# Patient Record
Sex: FEMALE | Race: WHITE | NOT HISPANIC OR LATINO | Employment: OTHER | URBAN - METROPOLITAN AREA
[De-identification: names, ages, dates, MRNs, and addresses within clinical notes are randomized per-mention and may not be internally consistent; named-entity substitution may affect disease eponyms.]

---

## 2023-12-11 ENCOUNTER — OFFICE VISIT (OUTPATIENT)
Dept: UROLOGY | Facility: CLINIC | Age: 73
End: 2023-12-11
Payer: MEDICARE

## 2023-12-11 DIAGNOSIS — N81.10 VAGINAL PROLAPSE: Primary | ICD-10-CM

## 2023-12-11 DIAGNOSIS — N39.3 STRESS INCONTINENCE: ICD-10-CM

## 2023-12-11 PROCEDURE — A4562 PESSARY, NON RUBBER,ANY TYPE: HCPCS | Performed by: STUDENT IN AN ORGANIZED HEALTH CARE EDUCATION/TRAINING PROGRAM

## 2023-12-11 PROCEDURE — 99205 OFFICE O/P NEW HI 60 MIN: CPT | Performed by: STUDENT IN AN ORGANIZED HEALTH CARE EDUCATION/TRAINING PROGRAM

## 2023-12-11 PROCEDURE — 57160 INSERT PESSARY/OTHER DEVICE: CPT | Performed by: STUDENT IN AN ORGANIZED HEALTH CARE EDUCATION/TRAINING PROGRAM

## 2023-12-11 RX ORDER — CELECOXIB 50 MG/1
400 CAPSULE ORAL ONCE
Status: CANCELLED | OUTPATIENT
Start: 2023-12-11 | End: 2023-12-11

## 2023-12-11 RX ORDER — GABAPENTIN 600 MG/1
600 TABLET ORAL ONCE
Status: CANCELLED | OUTPATIENT
Start: 2023-12-11 | End: 2023-12-11

## 2023-12-11 RX ORDER — ACETAMINOPHEN 325 MG/1
975 TABLET ORAL ONCE
Status: CANCELLED | OUTPATIENT
Start: 2023-12-11 | End: 2023-12-11

## 2023-12-11 NOTE — PROGRESS NOTES
"Referred by: Her friend    PCP  Cullen Bradley DO         CHIEF COMPLAINT:  NPV Bladder prolapse           HISTORY OF PRESENT ILLNESS:  This is a 73 y.o. y.o. female,  who presents as a new patient for a bladder prolapse. The patient can feel a vaginal bulge for the last two years. The patient denies UUI, urgency, frequency, and DEX. The patient stated her bowel movements are normal, going daily. She denies pelvic pain, dryness, gross hematuria. The patient is not sexually active but would like to be in the future. The patient had one vaginal birth stating she had tearing, the physician \"left extra skin\" in the repair causing her to have swelling and irritation near her anus. She reports she feels if she can empty her bladder, and can void without straining or pushing. The patient has not tried a pessary for her prolapse and would like to discuss surgery as an option. The patient denies a history of breast cancer.              Past Medical History  She has no past medical history on file.    Surgical History  She has a past surgical history that includes CT angio coronary art with heartflow if score >30% (2016).     Social History  She has no history on file for tobacco use, alcohol use, and drug use.    Family History  No family history on file.     Allergies  Patient has no allergy information on record.        A comprehensive 10+ review of systems was negative except for: see hpi                    PHYSICAL EXAMINATION:  BP Readings from Last 3 Encounters:   No data found for BP      Wt Readings from Last 3 Encounters:   No data found for Wt      BMI: There is no height or weight on file to calculate BMI.  BSA: There is no height or weight on file to calculate BSA.  HEENT: Normocephalic, atraumatic, PER EOMI, nonicteric, trachea normal, thyroid normal, oropharynx normal.  CARDIAC: regular rate & rhythm, S1 & S2 normal.  No heaves, thrills, gallops or murmurs.  LUNGS: Clear to auscultation, no spinal or " CV tenderness.  EXTREMITIES: No evidence of cyanosis, clubbing or edema.      Pelvic:  Genitourinary:  normal external genitalia, Bartholin's glands negative, [place]'s glands negative  Urethra   normal meatus, non-tender, no periurethral mass  Vaginal mucosa  normal  Cervix  normal  Uterus  normal size, nontender  Adnexae  negative nontender, no masses  Atrophy positive    CST negative  Pelvic floor muscle contraction  4/5    POP-Q (in supine position):        Aa 1     Ba 1     C -3              gh 3     pb 3     tvl 8              Ap -2     Bp -2     D -3    Rectal: no hemorrhoids, fissures or masses    PVR (by Ultrasound): 5         IMPRESSION AND PLAN:  Fallon Williamson is a 73 y.o.   who presents as a new patient for a bladder prolapse.     Stage 2 UVP     I discussed treatment options including pessary and surgery, with regard to surgery discussed hysterectomy vs. Hysteropexy: major benefit of hysteropexy is shorter OR time and less EBL and outcomes equivalent to hysterectomy + repair at 3 years, but no data beyond that time point. Also discussed SCP vs native tissue repair; for SCP the failure rate is 5-10%, but associated with mesh complications including erosion <1% and SBO <0.5% vs native tissue repair which is associated with 20-30% failure rate, but no long term risk of complications and only~15% requiring additional treatment.    Will tentatively schedule for lap prerna, scp    F/up after UDS    Fitted with #4 gelhorn     All questions and concerns were answered and addressed. The patient expressed understanding and agrees with the plan.     2023

## 2023-12-11 NOTE — LETTER
"2023     Cullen Bradley DO  [address] [place] [address]    Patient: Fallon Williamson   YOB: 1950   Date of Visit: 2023       Dear Dr. Cullen Bradley DO:    Thank you for referring Fallon Williamson to me for evaluation. Below are my notes for this consultation.  If you have questions, please do not hesitate to call me. I look forward to following your patient along with you.       Sincerely,     Jd Leon MD      CC: No Recipients  ______________________________________________________________________________________    Referred by: Her friend    PCP  Cullen Bradley DO         CHIEF COMPLAINT:  NPV Bladder prolapse           HISTORY OF PRESENT ILLNESS:  This is a 73 y.o. y.o. female,  who presents as a new patient for a bladder prolapse. The patient can feel a vaginal bulge for the last two years. The patient denies UUI, urgency, frequency, and DEX. The patient stated her bowel movements are normal, going daily. She denies pelvic pain, dryness, gross hematuria. The patient is not sexually active but would like to be in the future. The patient had one vaginal birth stating she had tearing, the physician \"left extra skin\" in the repair causing her to have swelling and irritation near her anus. She reports she feels if she can empty her bladder, and can void without straining or pushing. The patient has not tried a pessary for her prolapse and would like to discuss surgery as an option. The patient denies a history of breast cancer.              Past Medical History  She has no past medical history on file.    Surgical History  She has a past surgical history that includes CT angio coronary art with heartflow if score >30% (2016).     Social History  She has no history on file for tobacco use, alcohol use, and drug use.    Family History  No family history on file.     Allergies  Patient has no allergy information on record.        A comprehensive 10+ review of " systems was negative except for: see hpi                    PHYSICAL EXAMINATION:  BP Readings from Last 3 Encounters:   No data found for BP      Wt Readings from Last 3 Encounters:   No data found for Wt      BMI: There is no height or weight on file to calculate BMI.  BSA: There is no height or weight on file to calculate BSA.  HEENT: Normocephalic, atraumatic, PER EOMI, nonicteric, trachea normal, thyroid normal, oropharynx normal.  CARDIAC: regular rate & rhythm, S1 & S2 normal.  No heaves, thrills, gallops or murmurs.  LUNGS: Clear to auscultation, no spinal or CV tenderness.  EXTREMITIES: No evidence of cyanosis, clubbing or edema.      Pelvic:  Genitourinary:  normal external genitalia, Bartholin's glands negative, [place]'s glands negative  Urethra   normal meatus, non-tender, no periurethral mass  Vaginal mucosa  normal  Cervix  normal  Uterus  normal size, nontender  Adnexae  negative nontender, no masses  Atrophy positive    CST negative  Pelvic floor muscle contraction  4/5    POP-Q (in supine position):        Aa 1     Ba 1     C -3              gh 3     pb 3     tvl 8              Ap -2     Bp -2     D -3    Rectal: no hemorrhoids, fissures or masses    PVR (by Ultrasound): 5         IMPRESSION AND PLAN:  Fallon Williamson is a 73 y.o.   who presents as a new patient for a bladder prolapse.     Stage 2 UVP     I discussed treatment options including pessary and surgery, with regard to surgery discussed hysterectomy vs. Hysteropexy: major benefit of hysteropexy is shorter OR time and less EBL and outcomes equivalent to hysterectomy + repair at 3 years, but no data beyond that time point. Also discussed SCP vs native tissue repair; for SCP the failure rate is 5-10%, but associated with mesh complications including erosion <1% and SBO <0.5% vs native tissue repair which is associated with 20-30% failure rate, but no long term risk of complications and only~15% requiring additional treatment.    Will  tentatively schedule for lap prerna, scp    F/up after UDS    Fitted with #4 gelhorn     All questions and concerns were answered and addressed. The patient expressed understanding and agrees with the plan.     12/11/2023

## 2023-12-13 PROBLEM — N81.10 VAGINAL PROLAPSE: Status: ACTIVE | Noted: 2023-12-11

## 2023-12-13 PROBLEM — N39.3 STRESS INCONTINENCE: Status: ACTIVE | Noted: 2023-12-11

## 2024-01-08 ENCOUNTER — TELEMEDICINE (OUTPATIENT)
Dept: UROLOGY | Facility: CLINIC | Age: 74
End: 2024-01-08
Payer: MEDICARE

## 2024-01-08 DIAGNOSIS — N81.9 FEMALE GENITAL PROLAPSE, UNSPECIFIED TYPE: Primary | ICD-10-CM

## 2024-01-08 PROCEDURE — 99442 PR PHYS/QHP TELEPHONE EVALUATION 11-20 MIN: CPT | Performed by: STUDENT IN AN ORGANIZED HEALTH CARE EDUCATION/TRAINING PROGRAM

## 2024-01-08 NOTE — PROGRESS NOTES
HISTORY OF PRESENT ILLNESS:  Fallon Williamson is a 73 y.o. female presenting virtually today for a bladder prolapse follow up visit.  Having discomfort with pessayr, is able to tolerate until her UDS at end of month; but just wants to have prolapse corrected.            Past Medical History  She has no past medical history on file.    Surgical History  She has a past surgical history that includes CT angio coronary art with heartflow if score >30% (2016).     Social History  She has no history on file for tobacco use, alcohol use, and drug use.    Family History  No family history on file.     Allergies  Patient has no known allergies.      A comprehensive 10+ review of systems was negative except for: see hpi                     Assessment:  Fallon Williamson is a 73 y.o.   who presents for a bladder prolapse follow up visit     Stage 2 UVP        Will tentatively schedule for lap prerna, scp          Fitted with #4 gelhorn, this is uncomfortable  Requesting to have it out    Will remove at time prolapse      All questions and concerns were answered and addressed. The patient expressed understanding and agrees with the plan.       Jd Leon MD    Scribe Attestation  By signing my name below, I, Qasim Grier attest that this documentation has been prepared under the direction and in the presence of Jd Leon MD. All medical record entries made by the Scribe were at my direction or personally dictated by me.

## 2024-01-23 ENCOUNTER — PROCEDURE VISIT (OUTPATIENT)
Dept: UROLOGY | Facility: CLINIC | Age: 74
End: 2024-01-23
Payer: MEDICARE

## 2024-01-23 ENCOUNTER — APPOINTMENT (OUTPATIENT)
Dept: UROLOGY | Facility: CLINIC | Age: 74
End: 2024-01-23
Payer: MEDICARE

## 2024-01-23 DIAGNOSIS — N81.9 FEMALE GENITAL PROLAPSE, UNSPECIFIED TYPE: Primary | ICD-10-CM

## 2024-01-23 LAB
POC APPEARANCE, URINE: CLEAR
POC BILIRUBIN, URINE: ABNORMAL
POC BLOOD, URINE: ABNORMAL
POC COLOR, URINE: YELLOW
POC GLUCOSE, URINE: NEGATIVE MG/DL
POC KETONES, URINE: ABNORMAL MG/DL
POC LEUKOCYTES, URINE: ABNORMAL
POC NITRITE,URINE: NEGATIVE
POC PH, URINE: 5.5 PH
POC PROTEIN, URINE: NEGATIVE MG/DL
POC SPECIFIC GRAVITY, URINE: 1.02
POC UROBILINOGEN, URINE: 0.2 EU/DL

## 2024-01-23 PROCEDURE — 51784 ANAL/URINARY MUSCLE STUDY: CPT | Performed by: UROLOGY

## 2024-01-23 PROCEDURE — 51741 ELECTRO-UROFLOWMETRY FIRST: CPT | Performed by: UROLOGY

## 2024-01-23 PROCEDURE — 81003 URINALYSIS AUTO W/O SCOPE: CPT | Performed by: UROLOGY

## 2024-01-23 PROCEDURE — 51729 CYSTOMETROGRAM W/VP&UP: CPT | Performed by: UROLOGY

## 2024-01-23 PROCEDURE — 51797 INTRAABDOMINAL PRESSURE TEST: CPT | Performed by: UROLOGY

## 2024-01-23 NOTE — PROGRESS NOTES
Fallon Vallecaylaelsie 72 y/o female     Patient was referred by Dr. Leon to evaluate vaginal prolaspe.  Dr. Alcantara was present in the office at time of study.  Pre uroflow was completed today with a PVR of 120ml.  Urine was clear for UTI.  Patient did not leak on CLPP/VLPP.  Patient did not have DO with leak.  PVR after study was 0ml.    Patient instructed to increase fluids if blood in the urine or burning.  Patient made aware she may have blood rectally due to cath insertion.  Patient understood and consented to Urodynamics.  Patient will follow up with Dr. Leon to review results.  1/23/24/ DASHA

## 2024-01-30 ENCOUNTER — APPOINTMENT (OUTPATIENT)
Dept: UROLOGY | Facility: CLINIC | Age: 74
End: 2024-01-30
Payer: MEDICARE

## 2024-02-01 ENCOUNTER — OFFICE VISIT (OUTPATIENT)
Dept: UROLOGY | Facility: CLINIC | Age: 74
End: 2024-02-01
Payer: MEDICARE

## 2024-02-01 DIAGNOSIS — R10.2 PELVIC PAIN: Primary | ICD-10-CM

## 2024-02-01 DIAGNOSIS — M62.89 PELVIC FLOOR DYSFUNCTION: ICD-10-CM

## 2024-02-01 DIAGNOSIS — N81.9 FEMALE GENITAL PROLAPSE, UNSPECIFIED TYPE: ICD-10-CM

## 2024-02-01 PROCEDURE — A4562 PESSARY, NON RUBBER,ANY TYPE: HCPCS | Performed by: STUDENT IN AN ORGANIZED HEALTH CARE EDUCATION/TRAINING PROGRAM

## 2024-02-01 PROCEDURE — 51784 ANAL/URINARY MUSCLE STUDY: CPT | Performed by: STUDENT IN AN ORGANIZED HEALTH CARE EDUCATION/TRAINING PROGRAM

## 2024-02-01 PROCEDURE — 51741 ELECTRO-UROFLOWMETRY FIRST: CPT | Performed by: STUDENT IN AN ORGANIZED HEALTH CARE EDUCATION/TRAINING PROGRAM

## 2024-02-01 PROCEDURE — 51797 INTRAABDOMINAL PRESSURE TEST: CPT | Performed by: STUDENT IN AN ORGANIZED HEALTH CARE EDUCATION/TRAINING PROGRAM

## 2024-02-01 PROCEDURE — 51729 CYSTOMETROGRAM W/VP&UP: CPT | Performed by: STUDENT IN AN ORGANIZED HEALTH CARE EDUCATION/TRAINING PROGRAM

## 2024-02-01 PROCEDURE — 99214 OFFICE O/P EST MOD 30 MIN: CPT | Performed by: STUDENT IN AN ORGANIZED HEALTH CARE EDUCATION/TRAINING PROGRAM

## 2024-02-01 PROCEDURE — 57160 INSERT PESSARY/OTHER DEVICE: CPT | Performed by: STUDENT IN AN ORGANIZED HEALTH CARE EDUCATION/TRAINING PROGRAM

## 2024-02-01 NOTE — PROGRESS NOTES
HISTORY OF PRESENT ILLNESS:  Fallon Williamson is a 73 y.o. female presenting today for follow-up. She reports her pessary falls out often. And it is uncomfortable. Having pain in the RLQ quadrant. Had UDS recently demonstrated no evidence of stress incontinence.  She will not require a sling.         Past Medical History  She has no past medical history on file.    Surgical History  She has a past surgical history that includes CT angio coronary art with heartflow if score >30% (2016).     Social History  She has no history on file for tobacco use, alcohol use, and drug use.    Family History  No family history on file.     Allergies  Patient has no known allergies.      A comprehensive 10+ review of systems was negative except for: see hpi                          PHYSICAL EXAMINATION:  BP Readings from Last 3 Encounters:   No data found for BP      Wt Readings from Last 3 Encounters:   No data found for Wt      BMI: There is no height or weight on file to calculate BMI.  BSA: There is no height or weight on file to calculate BSA.  HEENT: Normocephalic, atraumatic, PER EOMI, nonicteric, trachea normal, thyroid normal, oropharynx normal.  CARDIAC: regular rate & rhythm, S1 & S2 normal.  No heaves, thrills, gallops or murmurs.  LUNGS: Clear to auscultation, no spinal or CV tenderness.  EXTREMITIES: No evidence of cyanosis, clubbing or edema.    POP-Q (in supine position):        Aa 1     Ba 1     C -3              gh 3     pb 3     tvl 8              Ap -2     Bp -2     D -3 replaced Gellhorn with a #4 ring with support    Assessment:  Fallon Williamson is a 73 y.o.   who presents for a bladder prolapse follow up visit     Stage 2 UVP   -Recommended physical floor therapy   -Replace Gellhorn with a #4 ring with support, this is more comfortable for her  -Discussed she most likely will need pelvic floor therapy after surgery given her discomfort with the pessary  -UDS did not demonstrate stress  incontinence, she will not need a sling  Planning on laparoscopic sacrocolpopexy with supracervical hysterectomy        Jd Leon MD      Scribe Attestation  By signing my name below, I, Mikala Bond, Scribe   attest that this documentation has been prepared under the direction and in the presence of Jd Leon MD.

## 2024-04-04 ENCOUNTER — APPOINTMENT (OUTPATIENT)
Dept: UROLOGY | Facility: CLINIC | Age: 74
End: 2024-04-04
Payer: MEDICARE

## 2024-04-11 ENCOUNTER — PRE-ADMISSION TESTING (OUTPATIENT)
Dept: PREADMISSION TESTING | Facility: HOSPITAL | Age: 74
End: 2024-04-11
Payer: MEDICARE

## 2024-04-11 ENCOUNTER — OFFICE VISIT (OUTPATIENT)
Dept: UROLOGY | Facility: CLINIC | Age: 74
End: 2024-04-11
Payer: MEDICARE

## 2024-04-11 ENCOUNTER — ANESTHESIA EVENT (OUTPATIENT)
Dept: OPERATING ROOM | Facility: HOSPITAL | Age: 74
End: 2024-04-11
Payer: MEDICARE

## 2024-04-11 VITALS
BODY MASS INDEX: 27.73 KG/M2 | WEIGHT: 156.53 LBS | RESPIRATION RATE: 18 BRPM | DIASTOLIC BLOOD PRESSURE: 69 MMHG | OXYGEN SATURATION: 100 % | HEART RATE: 65 BPM | SYSTOLIC BLOOD PRESSURE: 128 MMHG | TEMPERATURE: 97.7 F | HEIGHT: 63 IN

## 2024-04-11 DIAGNOSIS — N95.8 GENITOURINARY SYNDROME OF MENOPAUSE: Primary | ICD-10-CM

## 2024-04-11 DIAGNOSIS — Z01.818 PRE-OPERATIVE EXAMINATION: Primary | ICD-10-CM

## 2024-04-11 DIAGNOSIS — N81.9 FEMALE GENITAL PROLAPSE, UNSPECIFIED TYPE: ICD-10-CM

## 2024-04-11 LAB
ABO GROUP (TYPE) IN BLOOD: NORMAL
ALBUMIN SERPL BCP-MCNC: 4 G/DL (ref 3.4–5)
ALP SERPL-CCNC: 90 U/L (ref 33–136)
ALT SERPL W P-5'-P-CCNC: 22 U/L (ref 7–45)
ANION GAP SERPL CALC-SCNC: 13 MMOL/L (ref 10–20)
ANTIBODY SCREEN: NORMAL
AST SERPL W P-5'-P-CCNC: 21 U/L (ref 9–39)
BASOPHILS # BLD AUTO: 0.03 X10*3/UL (ref 0–0.1)
BASOPHILS NFR BLD AUTO: 0.4 %
BILIRUB SERPL-MCNC: 0.5 MG/DL (ref 0–1.2)
BUN SERPL-MCNC: 26 MG/DL (ref 6–23)
CALCIUM SERPL-MCNC: 9.5 MG/DL (ref 8.6–10.3)
CHLORIDE SERPL-SCNC: 105 MMOL/L (ref 98–107)
CO2 SERPL-SCNC: 25 MMOL/L (ref 21–32)
CREAT SERPL-MCNC: 0.74 MG/DL (ref 0.5–1.05)
EGFRCR SERPLBLD CKD-EPI 2021: 86 ML/MIN/1.73M*2
EOSINOPHIL # BLD AUTO: 0.09 X10*3/UL (ref 0–0.4)
EOSINOPHIL NFR BLD AUTO: 1.1 %
ERYTHROCYTE [DISTWIDTH] IN BLOOD BY AUTOMATED COUNT: 12.2 % (ref 11.5–14.5)
GLUCOSE SERPL-MCNC: 89 MG/DL (ref 74–99)
HCT VFR BLD AUTO: 38.9 % (ref 36–46)
HGB BLD-MCNC: 13 G/DL (ref 12–16)
IMM GRANULOCYTES # BLD AUTO: 0.03 X10*3/UL (ref 0–0.5)
IMM GRANULOCYTES NFR BLD AUTO: 0.4 % (ref 0–0.9)
LYMPHOCYTES # BLD AUTO: 2.85 X10*3/UL (ref 0.8–3)
LYMPHOCYTES NFR BLD AUTO: 34.6 %
MCH RBC QN AUTO: 31.8 PG (ref 26–34)
MCHC RBC AUTO-ENTMCNC: 33.4 G/DL (ref 32–36)
MCV RBC AUTO: 95 FL (ref 80–100)
MONOCYTES # BLD AUTO: 0.52 X10*3/UL (ref 0.05–0.8)
MONOCYTES NFR BLD AUTO: 6.3 %
NEUTROPHILS # BLD AUTO: 4.71 X10*3/UL (ref 1.6–5.5)
NEUTROPHILS NFR BLD AUTO: 57.2 %
NRBC BLD-RTO: 0 /100 WBCS (ref 0–0)
PLATELET # BLD AUTO: 223 X10*3/UL (ref 150–450)
POTASSIUM SERPL-SCNC: 4.2 MMOL/L (ref 3.5–5.3)
PROT SERPL-MCNC: 6.8 G/DL (ref 6.4–8.2)
RBC # BLD AUTO: 4.09 X10*6/UL (ref 4–5.2)
RH FACTOR (ANTIGEN D): NORMAL
SODIUM SERPL-SCNC: 139 MMOL/L (ref 136–145)
WBC # BLD AUTO: 8.2 X10*3/UL (ref 4.4–11.3)

## 2024-04-11 PROCEDURE — 99203 OFFICE O/P NEW LOW 30 MIN: CPT | Performed by: REGISTERED NURSE

## 2024-04-11 PROCEDURE — 87081 CULTURE SCREEN ONLY: CPT | Mod: GEALAB

## 2024-04-11 PROCEDURE — 36415 COLL VENOUS BLD VENIPUNCTURE: CPT

## 2024-04-11 PROCEDURE — 80053 COMPREHEN METABOLIC PANEL: CPT

## 2024-04-11 PROCEDURE — 99214 OFFICE O/P EST MOD 30 MIN: CPT | Performed by: STUDENT IN AN ORGANIZED HEALTH CARE EDUCATION/TRAINING PROGRAM

## 2024-04-11 PROCEDURE — 93005 ELECTROCARDIOGRAM TRACING: CPT

## 2024-04-11 PROCEDURE — 86901 BLOOD TYPING SEROLOGIC RH(D): CPT

## 2024-04-11 PROCEDURE — 85025 COMPLETE CBC W/AUTO DIFF WBC: CPT

## 2024-04-11 RX ORDER — NORTRIPTYLINE HYDROCHLORIDE 75 MG/1
75 CAPSULE ORAL NIGHTLY
COMMUNITY

## 2024-04-11 RX ORDER — TAMSULOSIN HYDROCHLORIDE 0.4 MG/1
CAPSULE ORAL
Qty: 10 CAPSULE | Refills: 0 | Status: SHIPPED | OUTPATIENT
Start: 2024-04-11

## 2024-04-11 RX ORDER — CHLORHEXIDINE GLUCONATE ORAL RINSE 1.2 MG/ML
15 SOLUTION DENTAL DAILY
Qty: 120 ML | Refills: 0 | Status: SHIPPED | OUTPATIENT
Start: 2024-04-11 | End: 2024-04-15

## 2024-04-11 RX ORDER — ASPIRIN 81 MG/1
81 TABLET ORAL DAILY
COMMUNITY

## 2024-04-11 RX ORDER — ESTRADIOL 0.1 MG/G
CREAM VAGINAL
Qty: 42.5 G | Refills: 12 | Status: SHIPPED | OUTPATIENT
Start: 2024-04-11

## 2024-04-11 ASSESSMENT — CHADS2 SCORE
DIABETES: NO
CHF: NO
HYPERTENSION: NO
PRIOR STROKE OR TIA OR THROMBOEMBOLISM: NO
AGE GREATER THAN OR EQUAL TO 75: NO
CHADS2 SCORE: 0

## 2024-04-11 ASSESSMENT — ENCOUNTER SYMPTOMS
EYES NEGATIVE: 1
NEUROLOGICAL NEGATIVE: 1
CARDIOVASCULAR NEGATIVE: 1
MUSCULOSKELETAL NEGATIVE: 1
GASTROINTESTINAL NEGATIVE: 1
NECK NEGATIVE: 1
CONSTITUTIONAL NEGATIVE: 1
RESPIRATORY NEGATIVE: 1

## 2024-04-11 ASSESSMENT — DUKE ACTIVITY SCORE INDEX (DASI)
CAN YOU DO MODERATE WORK AROUND THE HOUSE LIKE VACUUMING, SWEEPING FLOORS OR CARRYING GROCERIES: YES
CAN YOU WALK INDOORS, SUCH AS AROUND YOUR HOUSE: YES
CAN YOU WALK A BLOCK OR TWO ON LEVEL GROUND: YES
CAN YOU RUN A SHORT DISTANCE: YES
CAN YOU DO YARD WORK LIKE RAKING LEAVES, WEEDING OR PUSHING A MOWER: YES
CAN YOU TAKE CARE OF YOURSELF (EAT, DRESS, BATHE, OR USE TOILET): YES
CAN YOU PARTICIPATE IN MODERATE RECREATIONAL ACTIVITIES LIKE GOLF, BOWLING, DANCING, DOUBLES TENNIS OR THROWING A BASEBALL OR FOOTBALL: YES
CAN YOU CLIMB A FLIGHT OF STAIRS OR WALK UP A HILL: YES
CAN YOU DO LIGHT WORK AROUND THE HOUSE LIKE DUSTING OR WASHING DISHES: YES
CAN YOU PARTICIPATE IN STRENOUS SPORTS LIKE SWIMMING, SINGLES TENNIS, FOOTBALL, BASKETBALL, OR SKIING: YES
CAN YOU DO HEAVY WORK AROUND THE HOUSE LIKE SCRUBBING FLOORS OR LIFTING AND MOVING HEAVY FURNITURE: YES

## 2024-04-11 ASSESSMENT — PAIN SCALES - GENERAL: PAINLEVEL_OUTOF10: 0 - NO PAIN

## 2024-04-11 ASSESSMENT — PAIN - FUNCTIONAL ASSESSMENT: PAIN_FUNCTIONAL_ASSESSMENT: 0-10

## 2024-04-11 NOTE — H&P (VIEW-ONLY)
HISTORY OF PRESENT ILLNESS:  Fallon Williamson is a 73 y.o. female who presents today for a follow up visit. She is scheduled for a laparoscopic sacrocolpopexy with supracervical hysterectomy on 4/15/24. She has been trying to use boric acid and thinks that her pH is out of balance and states there is a smell.           Past Medical History  She has no past medical history on file.    Surgical History  She has a past surgical history that includes CT angio coronary art with heartflow if score >30% (2016).     Social History  She has no history on file for tobacco use, alcohol use, and drug use.    Family History  No family history on file.     Allergies  Patient has no known allergies.      A comprehensive 10+ review of systems was negative except for: see hpi                          PHYSICAL EXAMINATION:  BP Readings from Last 3 Encounters:   No data found for BP      Wt Readings from Last 3 Encounters:   No data found for Wt      BMI: There is no height or weight on file to calculate BMI.  BSA: There is no height or weight on file to calculate BSA.  HEENT: Normocephalic, atraumatic, PER EOMI, nonicteric, trachea normal, thyroid normal, oropharynx normal.  CARDIAC: regular rate & rhythm, S1 & S2 normal.  No heaves, thrills, gallops or murmurs.  LUNGS: Clear to auscultation, no spinal or CV tenderness.  EXTREMITIES: No evidence of cyanosis, clubbing or edema.    POP-Q (in supine position):        Aa 1     Ba 1     C -3              gh 3     pb 3     tvl 8              Ap -2     Bp -2     D -3           Assessment:  Fallon Williamson is a 73 y.o.   who presents for a bladder prolapse follow up visit     Stage 2 UVP   -Recommended physical floor therapy   -has #4 ring with support currently  -Discussed she most likely will need pelvic floor therapy after surgery given her discomfort with the pessary  -UDS did not demonstrate stress incontinence, she will not need a sling    -Rx flomax  Planning on  laparoscopic sacrocolpopexy with supracervical hysterectomy on 4/15/24    Follow up 2 weeks post op with Zuleika and 6 weeks with Dr. Carolyn ALFONSO:  Start estradiol     All questions and concerns were answered and addressed.  The patient expressed understanding and agrees with the plan.       Jd Leon MD    Scribe Attestation  By signing my name below, I, Jesusitapascale Lackey, Scribuday   attest that this documentation has been prepared under the direction and in the presence of Jd Leon MD.

## 2024-04-11 NOTE — CPM/PAT H&P
CPM/PAT Evaluation       Name: Fallon Williamson (Fallon Williamson)  /Age: 1950/73 y.o.     In-Person       Chief Complaint: Evaluation prior to surgery    HPI  73 year old female scheduled for DESARA SLING PLACEMENT(psb), LAPAROSCOPIC SUPRACERVICAL HYSTERECTOMY, SACRALCOLPOPEXY on 4/15/24 with Dr. Leon secondary to Vaginal prolapse, Stress incontinence. PMHx includes Hypothyroidism, migraines, kidney stones. Presents to [facility] today for preoperative risk stratification and optimization.   Past Medical History:   Diagnosis Date    Female bladder prolapse     Hypothyroidism     Kidney stones     Migraines     Presence of pessary        Past Surgical History:   Procedure Laterality Date    BREAST SURGERY Bilateral     reduction    CHOLECYSTECTOMY      CT ANGIO CORONARY ART WITH HEARTFLOW IF SCORE >30%  2016    CT HEART CORONARY ANGIOGRAM 2016 [facility]    EXTRACORPOREAL SHOCK WAVE LITHOTRIPSY      NERVE REPAIR      right forearm       Patient  has no history on file for sexual activity.    No family history on file.    Allergies   Allergen Reactions    Erythromycin Swelling and Rash     Throat swelling    Adhesive Tape-Silicones Itching       Prior to Admission medications    Not on File        PAT ROS:   Constitutional:   neg    Neuro/Psych:   neg    Eyes:   neg    Ears:   neg    Nose:   neg    Mouth:   neg    Throat:   neg    Neck:   neg    Cardio:   neg    Respiratory:   neg    Endocrine:   GI:   neg    :    vaginal issues  Musculoskeletal:   neg    Hematologic:   neg    Skin:  neg        Physical Exam  Vitals reviewed.   Constitutional:       Appearance: Normal appearance.   HENT:      Head: Normocephalic and atraumatic.      Nose: Nose normal.      Mouth/Throat:      Mouth: Mucous membranes are moist.      Pharynx: Oropharynx is clear.   Eyes:      Pupils: Pupils are equal, round, and reactive to light.   Neck:      Vascular: No carotid bruit.   Cardiovascular:      Rate and Rhythm:  Normal rate and regular rhythm.      Pulses: Normal pulses.      Heart sounds: Normal heart sounds.   Pulmonary:      Effort: Pulmonary effort is normal.      Breath sounds: Normal breath sounds.   Abdominal:      Palpations: Abdomen is soft.   Musculoskeletal:         General: Normal range of motion.      Cervical back: Normal range of motion and neck supple.   Skin:     General: Skin is warm and dry.      Capillary Refill: Capillary refill takes less than 2 seconds.   Neurological:      General: No focal deficit present.      Mental Status: She is alert and oriented to person, place, and time.   Psychiatric:         Mood and Affect: Mood normal.         Behavior: Behavior normal.         Thought Content: Thought content normal.         Judgment: Judgment normal.          PAT AIRWAY:   Airway:     Mallampati::  II    TM distance::  >3 FB    Neck ROM::  Full  normal        Visit Vitals  /69   Pulse 65   Temp 36.5 °C (97.7 °F) (Temporal)   Resp 18       DASI Risk Score    No data to display       Caprini DVT Assessment      Flowsheet Row Most Recent Value   DVT Score 6   Current Status Major surgery planned, including arthroscopic and laproscopic (1-2 hours)   History Prior major surgery   Age 60-75 years   BMI 30 or less          Modified Frailty Index    No data to display       CHADS2 Stroke Risk  Current as of 7 minutes ago        N/A 3 to 100%: High Risk   2 to < 3%: Medium Risk   0 to < 2%: Low Risk     Last Change: N/A          This score determines the patient's risk of having a stroke if the patient has atrial fibrillation.        This score is not applicable to this patient. Components are not calculated.          Revised Cardiac Risk Index      Flowsheet Row Most Recent Value   Revised Cardiac Risk Calculator 1          Apfel Simplified Score    No data to display       Risk Analysis Index Results This Encounter    No data found in the last 1 encounters.       Stop Bang Score      Flowsheet Row Most  Recent Value   Do you snore loudly? 1   Do you often feel tired or fatigued after your sleep? 0   Has anyone ever observed you stop breathing in your sleep? 0   Do you have or are you being treated for high blood pressure? 0   Recent BMI (Calculated) 0   Age older than 50 years old? 1=Yes   Is your neck circumference greater than 17 inches (Male) or 16 inches (Female)? 0   Gender - Male 0=No            Assessment and Plan:     Anesthesia:  The patient denies problems with anesthesia in the past such as PONV, prolonged sedation, awareness, dental damage, aspiration, cardiac arrest, difficult intubation, or unexpected hospital admissions.     Neuro:   The patient has no neurological diagnoses or significant findings on chart review, clinical presentation, and evaluation. No grossly apparent neurological perioperative risk. The patient is at increased risk for perioperative stroke secondary to increased age, female gender. Handouts for preoperative brain exercises given to patient.    HEENT/Airway  No diagnoses, significant findings on chart review, clinical presentation, or evaluation.    Cardiovascular  CT HEART CORONARY ANGIOGRAM 4/29/2016 [facility]  RCRI  The patient meets 0-1 RCRI criteria and therefore has a less than 1% risk of major adverse cardiac complications.  METS  The patient's functional capacity capacity is greater than 4 METS.  EKG  4/11/24  Normal Sinus Rhythm  Normal ECG  Rate 65  Heart Failure  The patient has no known history of heart failure.  Additionally, the patient reports no symptoms of heart failure and demonstrates no signs of heart failure.  Hypertension Evaluation  The patient has no known history of hypertension but presents with an elevated blood pressure today.   Heart Rhythm Evaluation  The patient has no history of arrhythmias.  Heart Valve Evaluation  The patient has no known history of valvular heart disease. The patient has no symptoms or physical exam findings to  suggest valvular heart disease.  Cardiology Evaluation  The patient is not followed by cardiology.      ITZEL score which indicates a 0.1% risk of intraoperative or 30-day postoperative.    Pulmonary   No significant findings on chart review or clinical presentation and evaluation.    The patient has a stop bang score of 2, which places patient at low risk for having JOHNNY.    ARISCAT 18, low, 1.6% risk of in-hospital postoperative pulmonary complications  PRODIGY 12, intermediate risk of respiratory depression episode. Patient given PI sheet for preoperative deep breathing exercises.    Hematology  No diagnoses or significant findings on chart review or clinical presentation and evaluation.  Antiplatelet management   The patient is currently receiving antiplatelet therapy for primary prevention of cardiovascular disease.  Anticoagulation management  The patient is not currently receiving anticoagulation therapy.    Caprini score 6, low, high risk of perioperative VTE.     Patient instructed to ambulate as soon as possible postoperatively to decrease thromboembolic risk. Initiate mechanical DVT prophylaxis as soon as possible and initiate chemical prophylaxis when deemed safe from a bleeding standpoint post surgery.       Gastrointestinal  No diagnoses or significant findings on chart review or clinical presentation and evaluation.  Eat 10- 0,  self-perceived oropharyngeal dysphagia scale (0-40)     Genitourinary  The patient has diagnoses or significant findings on chart review or clinical presentation and evaluation significant for Vaginal prolapse, Stress incontinence, on flomax.    Renal  No renal diagnoses or significant findings on chart review or clinical presentation and evaluation.    Musculoskeletal  No diagnoses or significant findings on chart review or clinical presentation and evaluation.    Endocrine  Diabetes Evaluation  The patient has no history of diabetes mellitus.  Thyroid Disease Evaluation  The  patient has a history of thyroid disease that appears controlled.on synthroid    ID  MRSA screening obtained. Prescriptions and instructions given for Hibiclens and Peridex.    -Preoperative medication instructions were provided and reviewed with the patient.  Any additional testing or evaluation was explained to the patient.  NPO Instructions were discussed, and the patient's questions were answered prior to conclusion of this encounter.

## 2024-04-11 NOTE — PREPROCEDURE INSTRUCTIONS
Medication List            Accurate as of April 11, 2024 10:08 AM. Always use your most recent med list.                aspirin 81 mg EC tablet  Notes to patient: Patient already stopped     chlorhexidine 0.12 % solution  Commonly known as: Peridex  Use 15 mL in the mouth or throat once daily for 2 days. Use 15ml once the night before surgery and 15ml once the morning of surgery  Notes to patient: Swish and spit     estradiol 0.01 % (0.1 mg/gram) vaginal cream  Commonly known as: Estrace  Insert pea size amount into vagina every night for 2 weeks, then 2x/week after  Notes to patient: Take as needed     levothyroxine 37.5 mcg capsule  Medication Adjustments for Surgery: Take morning of surgery with sip of water, no other fluids     nortriptyline 75 mg capsule  Commonly known as: Pamelor  Medication Adjustments for Surgery: Continue until night before surgery     tamsulosin 0.4 mg 24 hr capsule  Commonly known as: Flomax  Take 3 days before surgery and 7 days after  Notes to patient: Take as directed per Dr. Leon                      SURGERY PRE-OPERATIVE INSTRUCTIONS    *You will receive a phone call the day before your procedure  after 2pm, (or the Friday before your surgery if scheduled on a Monday.) Generally the hospital will be calling you with this information after that time.    *You are not to eat after midnight the night before the surgery. You may have 8oz of a clear liquid up until 2 hours prior to arriving to the hospital. The exception is with medications you were instructed to take day of surgery.    *You may take tylenol for pain/discomfort as needed.     *Stop taking all aspirin products, ibuprofen (motrin/advil), naproxen (aleve/naprosyn) for one week prior to surgery.    *Stop taking all vitamins and supplements one week prior to surgery.     *You should not have alcoholic beverages for 24 hours before surgery.     *You should not smoke 24 hours prior to surgery.     *To help prevent surgical  infections bathe/shower with Dial soap the evening before surgery.    *You can wear deodorant but no lotion, powder, or perfume/cologne. You should remove all make-up and nail polish at home.    *If you wear glasses, please bring a case for the glasses with you.    *You will be asked to remove dentures and contacts.     *Please leave all valuables at home.    *You should wear loose, comfortable clothing that will accommodate bandages and/or casts.    *You should notify your doctor of any change in your condition (fever, cold, rash, etc). Surgery may need to be re-scheduled until a time you are in better health.    *A responsible adult is required to accompany you to and from the hospital if you are receiving anesthesia or a sedative. Patients are not permitted to drive for 24 hours after anesthesia.     *You can use the [email or web address] if you wish.     *If you have any further questions please call [facility] [phone].     CHG BODY WASH INSTRUCTIONS    *Begin using your CHG soap five days prior to your scheduled surgery.  Allow the CHG soap to sit on skin for 3 minutes. Do not wash with regular soap after you have used the CHG soap. Pat yourself dry with a clean, fresh towel.    *Wash your face with normal soap and water. Apply the CHG solution to a clean, wet washcloth. Firmly lather your entire body from the neck down. Do not use on your face.     *Do not apply powders, deodorants, or lotions after using CHG wash.    *Dress in clean, freshly laundered night clothes.    *Be sure to sleep with clean, freshly laundered sheets.     *Be aware CHG wash may cause stains on fabrics. Rinse your washcloth and other linens that come in contact with CHG completely. Use non-chlorine detergents to launder items used.     *The morning of surgery is the fifth day, repeat the CHG wash and wear fresh laundered clothes.     *If you have any questions about the CHG soap, call [phone].      MOUTHRINSE INSTRUCTIONS    *CHG oral rinse  is used to kill a bacteria in the mouth known as Staphylococcus aureus. This reduces the risks of surgical site infections.     *Using dental rinse: use the CHG oral rinse after you brush your teeth the night before and the morning of the surgery. Follow all directions on your prescription label.     *Use 1 capful (15ml), swish and gargle for at least 30 seconds. Do not swallow. Spit rinse out.     *Do not rinse mouth with water, eat or drink after using CHG mouth rinse.     *Possible side effects: CHG rinse will stick to plaque on teeth. Brush and floss just before use. Teeth brushing will help to avoid staining of plaque during use.    *Any questions, please call [phone].

## 2024-04-13 LAB
ATRIAL RATE: 65 BPM
P AXIS: 71 DEGREES
P OFFSET: 175 MS
P ONSET: 145 MS
PR INTERVAL: 152 MS
Q ONSET: 221 MS
QRS COUNT: 11 BEATS
QRS DURATION: 86 MS
QT INTERVAL: 414 MS
QTC CALCULATION(BAZETT): 430 MS
QTC FREDERICIA: 425 MS
R AXIS: 15 DEGREES
STAPHYLOCOCCUS SPEC CULT: ABNORMAL
T AXIS: 87 DEGREES
T OFFSET: 428 MS
VENTRICULAR RATE: 65 BPM

## 2024-04-15 ENCOUNTER — HOSPITAL ENCOUNTER (OUTPATIENT)
Facility: HOSPITAL | Age: 74
Setting detail: OUTPATIENT SURGERY
Discharge: HOME | End: 2024-04-15
Attending: STUDENT IN AN ORGANIZED HEALTH CARE EDUCATION/TRAINING PROGRAM | Admitting: STUDENT IN AN ORGANIZED HEALTH CARE EDUCATION/TRAINING PROGRAM
Payer: MEDICARE

## 2024-04-15 ENCOUNTER — PHARMACY VISIT (OUTPATIENT)
Dept: PHARMACY | Facility: CLINIC | Age: 74
End: 2024-04-15
Payer: COMMERCIAL

## 2024-04-15 ENCOUNTER — ANESTHESIA (OUTPATIENT)
Dept: OPERATING ROOM | Facility: HOSPITAL | Age: 74
End: 2024-04-15
Payer: MEDICARE

## 2024-04-15 VITALS
OXYGEN SATURATION: 100 % | TEMPERATURE: 97.7 F | HEIGHT: 63 IN | DIASTOLIC BLOOD PRESSURE: 50 MMHG | RESPIRATION RATE: 18 BRPM | BODY MASS INDEX: 27.73 KG/M2 | HEART RATE: 63 BPM | WEIGHT: 156.53 LBS | SYSTOLIC BLOOD PRESSURE: 143 MMHG

## 2024-04-15 DIAGNOSIS — N81.10 VAGINAL PROLAPSE: Primary | ICD-10-CM

## 2024-04-15 DIAGNOSIS — N39.3 STRESS INCONTINENCE: ICD-10-CM

## 2024-04-15 DIAGNOSIS — G89.18 POST-OP PAIN: ICD-10-CM

## 2024-04-15 LAB
ABO GROUP (TYPE) IN BLOOD: NORMAL
RH FACTOR (ANTIGEN D): NORMAL

## 2024-04-15 PROCEDURE — 2500000005 HC RX 250 GENERAL PHARMACY W/O HCPCS: Performed by: STUDENT IN AN ORGANIZED HEALTH CARE EDUCATION/TRAINING PROGRAM

## 2024-04-15 PROCEDURE — 2780000003 HC OR 278 NO HCPCS: Performed by: STUDENT IN AN ORGANIZED HEALTH CARE EDUCATION/TRAINING PROGRAM

## 2024-04-15 PROCEDURE — 3600000009 HC OR TIME - EACH INCREMENTAL 1 MINUTE - PROCEDURE LEVEL FOUR: Performed by: STUDENT IN AN ORGANIZED HEALTH CARE EDUCATION/TRAINING PROGRAM

## 2024-04-15 PROCEDURE — A58541 PR LAP, SUPRACERVIAL HYSTERECTOMY, <250G: Performed by: NURSE ANESTHETIST, CERTIFIED REGISTERED

## 2024-04-15 PROCEDURE — RXMED WILLOW AMBULATORY MEDICATION CHARGE

## 2024-04-15 PROCEDURE — 3700000002 HC GENERAL ANESTHESIA TIME - EACH INCREMENTAL 1 MINUTE: Performed by: STUDENT IN AN ORGANIZED HEALTH CARE EDUCATION/TRAINING PROGRAM

## 2024-04-15 PROCEDURE — C1781 MESH (IMPLANTABLE): HCPCS | Performed by: STUDENT IN AN ORGANIZED HEALTH CARE EDUCATION/TRAINING PROGRAM

## 2024-04-15 PROCEDURE — 88305 TISSUE EXAM BY PATHOLOGIST: CPT | Performed by: STUDENT IN AN ORGANIZED HEALTH CARE EDUCATION/TRAINING PROGRAM

## 2024-04-15 PROCEDURE — 3700000001 HC GENERAL ANESTHESIA TIME - INITIAL BASE CHARGE: Performed by: STUDENT IN AN ORGANIZED HEALTH CARE EDUCATION/TRAINING PROGRAM

## 2024-04-15 PROCEDURE — 7100000010 HC PHASE TWO TIME - EACH INCREMENTAL 1 MINUTE: Performed by: STUDENT IN AN ORGANIZED HEALTH CARE EDUCATION/TRAINING PROGRAM

## 2024-04-15 PROCEDURE — 7100000009 HC PHASE TWO TIME - INITIAL BASE CHARGE: Performed by: STUDENT IN AN ORGANIZED HEALTH CARE EDUCATION/TRAINING PROGRAM

## 2024-04-15 PROCEDURE — 2500000005 HC RX 250 GENERAL PHARMACY W/O HCPCS: Performed by: NURSE ANESTHETIST, CERTIFIED REGISTERED

## 2024-04-15 PROCEDURE — 58542 LSH W/T/O UT 250 G OR LESS: CPT | Performed by: STUDENT IN AN ORGANIZED HEALTH CARE EDUCATION/TRAINING PROGRAM

## 2024-04-15 PROCEDURE — 2500000004 HC RX 250 GENERAL PHARMACY W/ HCPCS (ALT 636 FOR OP/ED): Performed by: NURSE ANESTHETIST, CERTIFIED REGISTERED

## 2024-04-15 PROCEDURE — 2500000001 HC RX 250 WO HCPCS SELF ADMINISTERED DRUGS (ALT 637 FOR MEDICARE OP): Performed by: STUDENT IN AN ORGANIZED HEALTH CARE EDUCATION/TRAINING PROGRAM

## 2024-04-15 PROCEDURE — 2500000005 HC RX 250 GENERAL PHARMACY W/O HCPCS: Performed by: ANESTHESIOLOGY

## 2024-04-15 PROCEDURE — 7100000001 HC RECOVERY ROOM TIME - INITIAL BASE CHARGE: Performed by: STUDENT IN AN ORGANIZED HEALTH CARE EDUCATION/TRAINING PROGRAM

## 2024-04-15 PROCEDURE — 7100000002 HC RECOVERY ROOM TIME - EACH INCREMENTAL 1 MINUTE: Performed by: STUDENT IN AN ORGANIZED HEALTH CARE EDUCATION/TRAINING PROGRAM

## 2024-04-15 PROCEDURE — 2720000007 HC OR 272 NO HCPCS: Performed by: STUDENT IN AN ORGANIZED HEALTH CARE EDUCATION/TRAINING PROGRAM

## 2024-04-15 PROCEDURE — 57425 LAPAROSCOPY SURG COLPOPEXY: CPT | Performed by: STUDENT IN AN ORGANIZED HEALTH CARE EDUCATION/TRAINING PROGRAM

## 2024-04-15 PROCEDURE — 36415 COLL VENOUS BLD VENIPUNCTURE: CPT | Performed by: STUDENT IN AN ORGANIZED HEALTH CARE EDUCATION/TRAINING PROGRAM

## 2024-04-15 PROCEDURE — 88305 TISSUE EXAM BY PATHOLOGIST: CPT | Mod: TC,GEALAB | Performed by: STUDENT IN AN ORGANIZED HEALTH CARE EDUCATION/TRAINING PROGRAM

## 2024-04-15 PROCEDURE — 3600000004 HC OR TIME - INITIAL BASE CHARGE - PROCEDURE LEVEL FOUR: Performed by: STUDENT IN AN ORGANIZED HEALTH CARE EDUCATION/TRAINING PROGRAM

## 2024-04-15 PROCEDURE — 2500000004 HC RX 250 GENERAL PHARMACY W/ HCPCS (ALT 636 FOR OP/ED): Performed by: ANESTHESIOLOGY

## 2024-04-15 DEVICE — MESH, Y, VERTESSA LITE 26 X 4 X 3CM: Type: IMPLANTABLE DEVICE | Site: UTERUS | Status: FUNCTIONAL

## 2024-04-15 RX ORDER — CEFAZOLIN SODIUM 2 G/100ML
2 INJECTION, SOLUTION INTRAVENOUS ONCE
Status: DISCONTINUED | OUTPATIENT
Start: 2024-04-15 | End: 2024-04-15 | Stop reason: HOSPADM

## 2024-04-15 RX ORDER — POLYETHYLENE GLYCOL 3350 17 G/17G
17 POWDER, FOR SOLUTION ORAL DAILY
Qty: 238 G | Refills: 0 | Status: SHIPPED | OUTPATIENT
Start: 2024-04-15

## 2024-04-15 RX ORDER — DROPERIDOL 2.5 MG/ML
0.62 INJECTION, SOLUTION INTRAMUSCULAR; INTRAVENOUS ONCE AS NEEDED
Status: DISCONTINUED | OUTPATIENT
Start: 2024-04-15 | End: 2024-04-15 | Stop reason: HOSPADM

## 2024-04-15 RX ORDER — OXYCODONE HYDROCHLORIDE 5 MG/1
5 TABLET ORAL EVERY 4 HOURS PRN
Status: DISCONTINUED | OUTPATIENT
Start: 2024-04-15 | End: 2024-04-15 | Stop reason: HOSPADM

## 2024-04-15 RX ORDER — MIDAZOLAM HYDROCHLORIDE 1 MG/ML
INJECTION INTRAMUSCULAR; INTRAVENOUS AS NEEDED
Status: DISCONTINUED | OUTPATIENT
Start: 2024-04-15 | End: 2024-04-15

## 2024-04-15 RX ORDER — ONDANSETRON HYDROCHLORIDE 2 MG/ML
4 INJECTION, SOLUTION INTRAVENOUS ONCE AS NEEDED
Status: COMPLETED | OUTPATIENT
Start: 2024-04-15 | End: 2024-04-15

## 2024-04-15 RX ORDER — HYDROMORPHONE HYDROCHLORIDE 2 MG/ML
INJECTION, SOLUTION INTRAMUSCULAR; INTRAVENOUS; SUBCUTANEOUS AS NEEDED
Status: DISCONTINUED | OUTPATIENT
Start: 2024-04-15 | End: 2024-04-15

## 2024-04-15 RX ORDER — CEFAZOLIN 1 G/1
INJECTION, POWDER, FOR SOLUTION INTRAVENOUS AS NEEDED
Status: DISCONTINUED | OUTPATIENT
Start: 2024-04-15 | End: 2024-04-15

## 2024-04-15 RX ORDER — KETOROLAC TROMETHAMINE 10 MG/1
10 TABLET, FILM COATED ORAL EVERY 6 HOURS PRN
Qty: 20 TABLET | Refills: 0 | Status: SHIPPED | OUTPATIENT
Start: 2024-04-15 | End: 2024-04-20

## 2024-04-15 RX ORDER — FENTANYL CITRATE 50 UG/ML
INJECTION, SOLUTION INTRAMUSCULAR; INTRAVENOUS AS NEEDED
Status: DISCONTINUED | OUTPATIENT
Start: 2024-04-15 | End: 2024-04-15

## 2024-04-15 RX ORDER — BUPIVACAINE HYDROCHLORIDE 5 MG/ML
INJECTION, SOLUTION PERINEURAL AS NEEDED
Status: DISCONTINUED | OUTPATIENT
Start: 2024-04-15 | End: 2024-04-15 | Stop reason: HOSPADM

## 2024-04-15 RX ORDER — ACETAMINOPHEN 325 MG/1
975 TABLET ORAL ONCE
Status: COMPLETED | OUTPATIENT
Start: 2024-04-15 | End: 2024-04-15

## 2024-04-15 RX ORDER — ADHESIVE BANDAGE
15 BANDAGE TOPICAL DAILY PRN
Qty: 360 ML | Refills: 0 | Status: SHIPPED | OUTPATIENT
Start: 2024-04-15

## 2024-04-15 RX ORDER — NORETHINDRONE AND ETHINYL ESTRADIOL 0.5-0.035
KIT ORAL AS NEEDED
Status: DISCONTINUED | OUTPATIENT
Start: 2024-04-15 | End: 2024-04-15

## 2024-04-15 RX ORDER — GLYCOPYRROLATE 0.2 MG/ML
INJECTION INTRAMUSCULAR; INTRAVENOUS AS NEEDED
Status: DISCONTINUED | OUTPATIENT
Start: 2024-04-15 | End: 2024-04-15

## 2024-04-15 RX ORDER — CELECOXIB 400 MG/1
400 CAPSULE ORAL ONCE
Status: COMPLETED | OUTPATIENT
Start: 2024-04-15 | End: 2024-04-15

## 2024-04-15 RX ORDER — ONDANSETRON HYDROCHLORIDE 2 MG/ML
INJECTION, SOLUTION INTRAVENOUS AS NEEDED
Status: DISCONTINUED | OUTPATIENT
Start: 2024-04-15 | End: 2024-04-15

## 2024-04-15 RX ORDER — TRAMADOL HYDROCHLORIDE 50 MG/1
50 TABLET ORAL EVERY 6 HOURS PRN
Qty: 20 TABLET | Refills: 0 | Status: SHIPPED | OUTPATIENT
Start: 2024-04-15 | End: 2024-04-20

## 2024-04-15 RX ORDER — LIDOCAINE HYDROCHLORIDE 20 MG/ML
INJECTION, SOLUTION INFILTRATION; PERINEURAL AS NEEDED
Status: DISCONTINUED | OUTPATIENT
Start: 2024-04-15 | End: 2024-04-15

## 2024-04-15 RX ORDER — SODIUM CHLORIDE, SODIUM LACTATE, POTASSIUM CHLORIDE, CALCIUM CHLORIDE 600; 310; 30; 20 MG/100ML; MG/100ML; MG/100ML; MG/100ML
100 INJECTION, SOLUTION INTRAVENOUS CONTINUOUS
Status: DISCONTINUED | OUTPATIENT
Start: 2024-04-15 | End: 2024-04-15 | Stop reason: HOSPADM

## 2024-04-15 RX ORDER — PHENYLEPHRINE HCL IN 0.9% NACL 0.4MG/10ML
SYRINGE (ML) INTRAVENOUS AS NEEDED
Status: DISCONTINUED | OUTPATIENT
Start: 2024-04-15 | End: 2024-04-15

## 2024-04-15 RX ORDER — DOCUSATE SODIUM 100 MG/1
100 CAPSULE, LIQUID FILLED ORAL 2 TIMES DAILY
Qty: 10 CAPSULE | Refills: 0 | Status: SHIPPED | OUTPATIENT
Start: 2024-04-15 | End: 2024-04-20

## 2024-04-15 RX ORDER — ROCURONIUM BROMIDE 10 MG/ML
INJECTION, SOLUTION INTRAVENOUS AS NEEDED
Status: DISCONTINUED | OUTPATIENT
Start: 2024-04-15 | End: 2024-04-15

## 2024-04-15 RX ORDER — PROPOFOL 10 MG/ML
INJECTION, EMULSION INTRAVENOUS AS NEEDED
Status: DISCONTINUED | OUTPATIENT
Start: 2024-04-15 | End: 2024-04-15

## 2024-04-15 RX ORDER — VASOPRESSIN 20 U/ML
INJECTION PARENTERAL AS NEEDED
Status: DISCONTINUED | OUTPATIENT
Start: 2024-04-15 | End: 2024-04-15

## 2024-04-15 RX ORDER — ACETAMINOPHEN 500 MG
1000 TABLET ORAL EVERY 6 HOURS PRN
Qty: 20 TABLET | Refills: 0 | Status: SHIPPED | OUTPATIENT
Start: 2024-04-15 | End: 2024-04-20

## 2024-04-15 RX ORDER — GABAPENTIN 300 MG/1
600 CAPSULE ORAL ONCE
Status: DISCONTINUED | OUTPATIENT
Start: 2024-04-15 | End: 2024-04-15 | Stop reason: HOSPADM

## 2024-04-15 RX ADMIN — SUGAMMADEX 200 MG: 100 INJECTION, SOLUTION INTRAVENOUS at 10:26

## 2024-04-15 RX ADMIN — EPHEDRINE SULFATE 15 MG: 50 INJECTION, SOLUTION INTRAVENOUS at 08:15

## 2024-04-15 RX ADMIN — FENTANYL CITRATE 50 MCG: 50 INJECTION, SOLUTION INTRAMUSCULAR; INTRAVENOUS at 07:36

## 2024-04-15 RX ADMIN — VASOPRESSIN 1 UNITS: 20 INJECTION INTRAVENOUS at 08:49

## 2024-04-15 RX ADMIN — VASOPRESSIN 2 UNITS: 20 INJECTION INTRAVENOUS at 08:19

## 2024-04-15 RX ADMIN — DEXAMETHASONE SODIUM PHOSPHATE 4 MG: 4 INJECTION INTRA-ARTICULAR; INTRALESIONAL; INTRAMUSCULAR; INTRAVENOUS; SOFT TISSUE at 09:52

## 2024-04-15 RX ADMIN — Medication 120 MCG: at 09:01

## 2024-04-15 RX ADMIN — MIDAZOLAM HYDROCHLORIDE 1 MG: 1 INJECTION, SOLUTION INTRAMUSCULAR; INTRAVENOUS at 07:29

## 2024-04-15 RX ADMIN — SODIUM CHLORIDE, POTASSIUM CHLORIDE, SODIUM LACTATE AND CALCIUM CHLORIDE 100 ML/HR: 600; 310; 30; 20 INJECTION, SOLUTION INTRAVENOUS at 13:16

## 2024-04-15 RX ADMIN — PROPOFOL 100 MG: 10 INJECTION, EMULSION INTRAVENOUS at 07:37

## 2024-04-15 RX ADMIN — EPHEDRINE SULFATE 10 MG: 50 INJECTION, SOLUTION INTRAVENOUS at 08:57

## 2024-04-15 RX ADMIN — HYDROMORPHONE HYDROCHLORIDE 0.4 MG: 2 INJECTION, SOLUTION INTRAMUSCULAR; INTRAVENOUS; SUBCUTANEOUS at 10:04

## 2024-04-15 RX ADMIN — ROCURONIUM BROMIDE 50 MG: 10 INJECTION, SOLUTION INTRAVENOUS at 07:37

## 2024-04-15 RX ADMIN — EPHEDRINE SULFATE 10 MG: 50 INJECTION, SOLUTION INTRAVENOUS at 08:12

## 2024-04-15 RX ADMIN — VASOPRESSIN 1 UNITS: 20 INJECTION INTRAVENOUS at 08:17

## 2024-04-15 RX ADMIN — ROCURONIUM BROMIDE 10 MG: 10 INJECTION, SOLUTION INTRAVENOUS at 09:04

## 2024-04-15 RX ADMIN — VASOPRESSIN 1 UNITS: 20 INJECTION INTRAVENOUS at 08:41

## 2024-04-15 RX ADMIN — ACETAMINOPHEN 975 MG: 325 TABLET ORAL at 07:04

## 2024-04-15 RX ADMIN — ROCURONIUM BROMIDE 10 MG: 10 INJECTION, SOLUTION INTRAVENOUS at 08:20

## 2024-04-15 RX ADMIN — FENTANYL CITRATE 50 MCG: 50 INJECTION, SOLUTION INTRAMUSCULAR; INTRAVENOUS at 07:29

## 2024-04-15 RX ADMIN — LIDOCAINE HYDROCHLORIDE 100 MG: 20 INJECTION, SOLUTION INFILTRATION; PERINEURAL at 07:37

## 2024-04-15 RX ADMIN — Medication 120 MCG: at 07:53

## 2024-04-15 RX ADMIN — SODIUM CHLORIDE, POTASSIUM CHLORIDE, SODIUM LACTATE AND CALCIUM CHLORIDE 100 ML/HR: 600; 310; 30; 20 INJECTION, SOLUTION INTRAVENOUS at 07:03

## 2024-04-15 RX ADMIN — VASOPRESSIN 2 UNITS: 20 INJECTION INTRAVENOUS at 09:36

## 2024-04-15 RX ADMIN — SODIUM CHLORIDE, POTASSIUM CHLORIDE, SODIUM LACTATE AND CALCIUM CHLORIDE 100 ML/HR: 600; 310; 30; 20 INJECTION, SOLUTION INTRAVENOUS at 12:16

## 2024-04-15 RX ADMIN — CELECOXIB 400 MG: 400 CAPSULE ORAL at 07:04

## 2024-04-15 RX ADMIN — ONDANSETRON 4 MG: 2 INJECTION INTRAMUSCULAR; INTRAVENOUS at 12:55

## 2024-04-15 RX ADMIN — GLYCOPYRROLATE 0.2 MG: 0.2 INJECTION, SOLUTION INTRAMUSCULAR; INTRAVENOUS at 08:00

## 2024-04-15 RX ADMIN — CEFAZOLIN 2 G: 330 INJECTION, POWDER, FOR SOLUTION INTRAMUSCULAR; INTRAVENOUS at 07:32

## 2024-04-15 RX ADMIN — ONDANSETRON 4 MG: 2 INJECTION INTRAMUSCULAR; INTRAVENOUS at 09:59

## 2024-04-15 RX ADMIN — Medication: at 10:46

## 2024-04-15 RX ADMIN — EPHEDRINE SULFATE 5 MG: 50 INJECTION, SOLUTION INTRAVENOUS at 08:05

## 2024-04-15 RX ADMIN — EPHEDRINE SULFATE 10 MG: 50 INJECTION, SOLUTION INTRAVENOUS at 09:16

## 2024-04-15 RX ADMIN — VASOPRESSIN 2 UNITS: 20 INJECTION INTRAVENOUS at 09:18

## 2024-04-15 SDOH — HEALTH STABILITY: MENTAL HEALTH: CURRENT SMOKER: 0

## 2024-04-15 ASSESSMENT — PAIN SCALES - GENERAL
PAINLEVEL_OUTOF10: 3
PAINLEVEL_OUTOF10: 3
PAIN_LEVEL: 2
PAINLEVEL_OUTOF10: 0 - NO PAIN

## 2024-04-15 ASSESSMENT — PAIN - FUNCTIONAL ASSESSMENT
PAIN_FUNCTIONAL_ASSESSMENT: 0-10

## 2024-04-15 NOTE — OP NOTE
LAPAROSCOPIC SUPRACERVICAL HYSTERECTOMY, SACRALCOLPOPEXY, Cystoscopy Rigid, Salpingo Oophorectomy Laparoscopy (B) Operative Note     Date: 4/15/2024  OR Location: [facility] OR    Name: Fallon Williamson, : 1950, Age: 73 y.o., MRN: [id number], Sex: female    Diagnosis  Pre-op Diagnosis     * Vaginal prolapse [N81.10]     * Stress incontinence [N39.3] Post-op Diagnosis     * Vaginal prolapse [N81.10]     * Stress incontinence [N39.3]     Procedures  LAPAROSCOPIC SUPRACERVICAL HYSTERECTOMY  59522 - [place] LAPAROSCOPY SUPRACERVICAL HYSTERECTOMY 250 GM/<    SACRALCOLPOPEXY  39331 - [place] LAPAROSCOPY COLPOPEXY SUSPENSION VAGINAL APEX    Cystoscopy Rigid  40212 - [place] CYSTOURETHROSCOPY    Salpingo Oophorectomy Laparoscopy  30177 - [place] LAPAROSCOPY W/RMVL ADNEXAL STRUCTURES      Surgeons      * Jd Leon - Primary    Resident/Fellow/Other Assistant:  Surgeons and Role:     * Jocelyne Faustin MD - Assisting     * Zuleika Marx PA-C - [facility] First Assist    Procedure Summary  Anesthesia: Consult  ASA: II  Anesthesia Staff: CRNA: TOÑO Prakash-CRNA  Estimated Blood Loss: 10 mL  Intra-op Medications:   Administrations occurring from 0730 to 1100 on 04/15/24:   Medication Name Total Dose   BUPivacaine HCl (Marcaine) 0.5 % (5 mg/mL) injection 25 mL   lactated Ringer's infusion Cannot be calculated              Anesthesia Record               Intraprocedure I/O Totals       None           Specimen:   ID Type Source Tests Collected by Time   1 : BILATERAL TUBES, OVARIES, AND UTERUS- NO CERVIX Tissue UTERUS, CERVIX, FALLOPIAN TUBES AND OVARIES BILATERAL SURGICAL PATHOLOGY EXAM Jd Leon MD 4/15/2024 1000        Staff:   Circulator: Viri Nunez RN  Relief Circulator: Jackelyn Livingston RN  Relief Scrub: Jackelyn Livingston RN  Scrub Person: Sarah Bateman         Drains and/or Catheters:   Urethral Catheter 16 Fr. (Active)       Tourniquet Times:         Implants:  Implants       Type Name Action Serial No.       Surgical Mesh Sling Implant MESH, Y, VERTESSA LITE 26 X 4 X 3CM - [id number] Implanted               Findings: stage 2 uterovaginal prolapse    Indications: Fallon Williamson is an 73 y.o. female who is having surgery for Vaginal prolapse [N81.10]  Stress incontinence [N39.3].     The patient was seen in the preoperative area. The risks, benefits, complications, treatment options, non-operative alternatives, expected recovery and outcomes were discussed with the patient. The possibilities of reaction to medication, pulmonary aspiration, injury to surrounding structures, bleeding, recurrent infection, the need for additional procedures, failure to diagnose a condition, and creating a complication requiring transfusion or operation were discussed with the patient. The patient concurred with the proposed plan, giving informed consent.  The site of surgery was properly noted/marked if necessary per policy. The patient has been actively warmed in preoperative area. Preoperative antibiotics have been ordered and given within 1 hours of incision. Venous thrombosis prophylaxis have been ordered including bilateral sequential compression devices    Procedure Details: After informed consent was obtained, the patient was taken to the operating room where general anesthesia via oral ETT was administered. An orogastric tube was placed. She was placed in the dorsal lithotomy position using yellofin stirrups, being careful not to hyperflex or hyperabduct the legs. Arms were tucked at the sides, SCD stockings were placed, and a time-out was performed. She was then prepped and draped in the usual sterile fashion.     First, a naiper was placed to gravity and a medium Vcare uterine manipulator was inserted. Gloves were changed, and attention was then turned to the abdomen. The umbilicus was grasped with the Allis clamp and incised with a scalpel. The incision was carried down to the fascia which was grasped with kochers x 2 and  "incised. The superior and inferior aspects of the fascia were tagged with 0-Vicryl stay sutures. The 10 mm Kong trocar was then introduced and CO2 gas was then used to fully insufflate the peritoneum to a pressure of 15 mmHg. Two 5 mm right and left lower quadrant trocars were then placed under direct visualization. A 10mm suprapubic port was also placed approximately 2 cm above the pubic symphysis. These were all placed atraumatically under direct visualization.  An anatomic survery was undertaken that demonstrated no adhesions.    Good manipulation was obtained with the Vcare manipulator.  Using the bipolar cauter electrosurgical device, the right IP ligament was secured and ligated, then sequential bites were taken through the broad ligament to the round ligament was then secured and ligated. The broad ligament was then opened and dissected anteriorly to create a bladder flap as well as posteriorly. The same was done on the left side, and this was carried down to the level of the uterine vessels. The uterine vessels were then skeletonized, clamped, coagulated and then transected using Ligasure and the bipolar and scissors. Hemostasis was achieved with the bipolar cautery device. The Vcare was seen around the cervix and the cup was skeletonized free of tissue. The uterus was amputated at the cervicouterine junction with the monopolar L-hook and carried around circumferentially. The uterus was freed and placed in the right paracolic gutter. The cervix was imbricated using 6\" strata-fix suture. Hemostasis was achieved.     We then  turned our attention to the presacral dissection.  The sigmoid was swept  out of the pelvis and held out of the way with an endo loop stitch on the epipoloica.  The sacral promontory was identified.  The  peritoneum over the sacral promontory was grasped and entered sharply.  Careful sharp and blunt dissection were then used to develop the  presacral ligament plane.  The middle sacral " artery was identified,  cauterized, and transected.  The peritoneum was then dissected in a  caudal fashion toward the right uterosacral ligament in order to create  a space to peritonealize the mesh.  We then placed the Upsylon mesh into  the abdomen and attached at the anterior and posterior cervical stumps  and vagina using multiple interrupted 0 PDS sutures.  The vaginal apex  was then tensioned appropriately with the C-point 8 cm proximal to the  hymen and the mesh was attached to the sacral ligament using 2  interrupted 0 Ethibond sutures.  The peritoneum was then closed entirely  over the mesh using a running 0 Vicryl stitch.  Once this was completed,  cystoscopy was performed and spill of urine was noted bilaterally from the ureteral orifices.  The bladder was then fully evaluated and found to not have any abnormalities The bladder was drained and the Green was  replaced.  We then extended the umbilical incision and placed an Nubia  retractor through the abdomen and brought the uterine specimen to the  retractor and then proceeded to remove it without difficulty.  Once this was completed, the fascia was closed using  interrupted figure-of-eight 0 Vicryl sutures.   The skin incisions were all closed using a running 4-0  Vicryl.    Complications:  None; patient tolerated the procedure well.    Disposition: PACU - hemodynamically stable.  Condition: stable         Additional Details:     Attending Attestation: I was present and scrubbed for the entire procedure.    Jd Leon  Phone Number: [phone]

## 2024-04-15 NOTE — DISCHARGE INSTRUCTIONS
Call Dr. Leon for any problems and/or concerns     *Walk as much as possible, it is ok to use stairs carefully  *Ok to shower, avoid soaking in tub baths or swimming for 6 weeks after surgery   *Continue the coughing and deep breathing exercises that your learned in the hospital  *No lifting/straining and avoid constipation for 6 weeks (Avoid strenuous activity)  *Prevent constipation by using stool softeners and staying hydrated, so that you do not strain against your stitches or have pain from constipation  *Vaginal rest for 6 weeks (Do not put anything in your vagina except prescribed vaginal estrogen. Do not use tampons or douches. Do not have sex until cleared by physician)     Call Doctor right away for:     *Fever above 100.4/shaking chills  *Bright red vaginal bleeding or bleeding that soaks more than one sanitary pad per hour  *A foul smelling discharge from the vagina  *Trouble urinating or burning with urination  *Severe pain or bloating in your abdomen  *Persistent nausea/vomiting  *Redness, swelling, or drainage at your incision sites  *Chest pain/shortness of breath-call 911.     - You will be going home with prescriptions for pain medication. If you are able to take them, alternate a dose of Acetaminophen (Tylenol) and Ketorolac (Toradol) every 3 hours. (For example, 1000mg of Tylenol at 09:00am, 10mg Toradol at 12:00pm, 1000mg of Tylenol at 3:00pm, 10mg Toradol at 6:00pm).   - Use any prescribed narcotic (ie Tramadol) for breakthrough pain as prescribed. Use a stool softener, such as Miralax, to prevent constipation from the narcotic medication.

## 2024-04-15 NOTE — ANESTHESIA PROCEDURE NOTES
Airway  Date/Time: 4/15/2024 7:39 AM  Urgency: elective    Airway not difficult    Staffing  Performed: CRNA   Authorized by: SHERRI Prakash    Performed by: SHERRI Prakash  Patient location during procedure: OR    Indications and Patient Condition  Indications for airway management: anesthesia  Spontaneous Ventilation: absent  Sedation level: deep  Preoxygenated: yes  Patient position: sniffing  Mask difficulty assessment: 2 - vent by mask + OA or adjuvant +/- NMBA    Final Airway Details  Final airway type: endotracheal airway      Successful airway: ETT  Cuffed: yes   Successful intubation technique: video laryngoscopy  Facilitating devices/methods: intubating stylet  Blade: Heather  Blade size: #4  ETT size (mm): 7.0  Cormack-Lehane Classification: grade I - full view of glottis  Placement verified by: chest auscultation and capnometry   Measured from: lips  ETT to lips (cm): 22  Number of attempts at approach: 1  Number of other approaches attempted: 0

## 2024-04-15 NOTE — ANESTHESIA PREPROCEDURE EVALUATION
Patient: Fallon Williamson    Procedure Information       Date/Time: 04/15/24 0730    Procedures:       DESARA SLING PLACEMENT      LAPAROSCOPIC SUPRACERVICAL HYSTERECTOMY      SACRALCOLPOPEXY    Location: [facility] OR 07 / [facility]    Surgeons: Jd Leon MD          Vitals:    04/15/24 0650   BP: 138/70   Pulse: 69   Resp: 16   Temp: 36 °C (96.8 °F)       Past Surgical History:   Procedure Laterality Date    BREAST SURGERY Bilateral     reduction    CHOLECYSTECTOMY      CT ANGIO CORONARY ART WITH HEARTFLOW IF SCORE >30%  04/29/2016    CT HEART CORONARY ANGIOGRAM 4/29/2016 [facility]    EXTRACORPOREAL SHOCK WAVE LITHOTRIPSY      NERVE REPAIR      right forearm     Past Medical History:   Diagnosis Date    Female bladder prolapse     Hypothyroidism     Kidney stones     Migraines     Presence of pessary        Current Facility-Administered Medications:     ceFAZolin in dextrose (iso-os) (Ancef) IVPB 2 g, 2 g, intravenous, Once, Jd Leon MD    gabapentin (Neurontin) capsule 600 mg, 600 mg, oral, Once, Jd Leon MD    lactated Ringer's infusion, 100 mL/hr, intravenous, Continuous, Juve Perez MD, Last Rate: 100 mL/hr at 04/15/24 0731, Continued by Anesthesia at 04/15/24 0731    Facility-Administered Medications Ordered in Other Encounters:     ceFAZolin (Ancef) injection, , intravenous, PRN, SHERRI Prakash, 2 g at 04/15/24 0732    ePHEDrine injection, , intravenous, PRN, TOÑO Prakash-CRNA, 10 mg at 04/15/24 0812    fentaNYL PF (Sublimaze) injection, , intravenous, PRN, TOÑO Prakash-CRNA, 50 mcg at 04/15/24 0736    glycopyrrolate (Robinul) 200 mcg/mL injection, , intravenous, PRN, TOÑO Prakash-CRNA, 0.2 mg at 04/15/24 0800    lidocaine (Xylocaine) 20 mg/mL (2 %) injection, , miscellaneous, PRN, TOÑO Prakash-CRNA, 100 mg at 04/15/24 0737    midazolam (Versed) injection, , intravenous, PRN, TOÑO Prakash-CRNA, 1 mg at  04/15/24 0729    phenylephrine HCl in 0.9% NaCl syringe, , intravenous, PRN, SHERRI Prakash, 120 mcg at 04/15/24 0753    propofol (Diprivan) injection, , intravenous, PRN, SHERRI Prakash, 100 mg at 04/15/24 0737    rocuronium (ZeMuron) injection, , intravenous, PRN, SHERRI Prakash, 50 mg at 04/15/24 0737  Prior to Admission medications    Medication Sig Start Date End Date Taking? Authorizing Provider   aspirin 81 mg EC tablet Take 1 tablet (81 mg) by mouth once daily.   Yes Historical Provider, MD   chlorhexidine (Peridex) 0.12 % solution Use 15 mL in the mouth or throat once daily for 2 days. Use 15ml once the night before surgery and 15ml once the morning of surgery 4/11/24 4/15/24 Yes JOSÉ MIGUEL Barger   levothyroxine 37.5 mcg capsule Take by mouth early in the morning..   Yes Historical Provider, MD   nortriptyline (Pamelor) 75 mg capsule Take 1 capsule (75 mg) by mouth once daily at bedtime.   Yes Historical Provider, MD   tamsulosin (Flomax) 0.4 mg 24 hr capsule Take 3 days before surgery and 7 days after 4/11/24  Yes Jd Leon MD   acetaminophen (Tylenol) 500 mg tablet Take 2 tablets (1,000 mg) by mouth every 6 hours if needed for mild pain (1 - 3) for up to 5 days. 4/15/24 4/20/24  Zuleika Marx PA-C   docusate sodium (Colace) 100 mg capsule Take 1 capsule (100 mg) by mouth 2 times a day for 5 days. 4/15/24 4/20/24  Zuleika Marx PA-C   estradiol (Estrace) 0.01 % (0.1 mg/gram) vaginal cream Insert pea size amount into vagina every night for 2 weeks, then 2x/week after  Patient not taking: Reported on 4/11/2024 4/11/24   Jd Leon MD   ketorolac (Toradol) 10 mg tablet Take 1 tablet (10 mg) by mouth every 6 hours if needed for moderate pain (4 - 6) for up to 5 days. 4/15/24 4/20/24  Zuleika Marx PA-C   magnesium hydroxide (Milk of Magnesia) 400 mg/5 mL suspension Take 15 mL by mouth once daily as needed for constipation (if have not had a  "BM by postoperative day 3). 4/15/24   Zuleika Marx PA-C   polyethylene glycol (Glycolax, Miralax) 17 gram packet Take 17 g by mouth once daily. 4/15/24 5/15/24  Zuleika Marx PA-C   traMADol (Ultram) 50 mg tablet Take 1 tablet (50 mg) by mouth every 6 hours if needed for severe pain (7 - 10) (Start taking if you have not had a bowel movement by day 3 post-op) for up to 5 days. 4/15/24 4/20/24  Zuleika Marx PA-C     Allergies   Allergen Reactions    Erythromycin Swelling and Rash     Throat swelling    Adhesive Tape-Silicones Itching     Social History     Tobacco Use    Smoking status: Never    Smokeless tobacco: Never   Substance Use Topics    Alcohol use: Not Currently         Chemistry    Lab Results   Component Value Date/Time     04/11/2024 1008    K 4.2 04/11/2024 1008     04/11/2024 1008    CO2 25 04/11/2024 1008    BUN 26 (H) 04/11/2024 1008    CREATININE 0.74 04/11/2024 1008    Lab Results   Component Value Date/Time    CALCIUM 9.5 04/11/2024 1008    ALKPHOS 90 04/11/2024 1008    AST 21 04/11/2024 1008    ALT 22 04/11/2024 1008    BILITOT 0.5 04/11/2024 1008          Lab Results   Component Value Date/Time    WBC 8.2 04/11/2024 1008    HGB 13.0 04/11/2024 1008    HCT 38.9 04/11/2024 1008     04/11/2024 1008     No results found for: \"PROTIME\", \"PTT\", \"INR\"  Encounter Date: 04/11/24   ECG 12 lead   Result Value    Ventricular Rate 65    Atrial Rate 65    WI Interval 152    QRS Duration 86    QT Interval 414    QTC Calculation(Bazett) 430    P Axis 71    R Axis 15    T Axis 87    QRS Count 11    Q Onset 221    P Onset 145    P Offset 175    T Offset 428    QTC Fredericia 425    Narrative    Normal sinus rhythm  Normal ECG  No previous ECGs available  Confirmed by Farrukh Cunningham ([id number]) on 4/13/2024 9:54:25 PM     No results found for this or any previous visit from the past 1095 days.      Relevant Problems   No relevant active problems       Clinical information reviewed:   " Tobacco  Allergies  Meds   Med Hx  Surg Hx  OB Status  Fam Hx  Soc   Hx        NPO Detail:  NPO/Void Status  Carbohydrate Drink Given Prior to Surgery? : N  Date of Last Liquid: 04/14/24  Time of Last Liquid: 2300  Date of Last Solid: 04/14/24  Time of Last Solid: 2300  Last Intake Type: Clear fluids  Time of Last Void: 0600         Physical Exam    Airway  Mallampati: II  Neck ROM: full     Cardiovascular - normal exam     Dental    Pulmonary    Abdominal            Anesthesia Plan    History of general anesthesia?: yes  History of complications of general anesthesia?: no    ASA 2     general     The patient is not a current smoker.    intravenous induction   Postoperative administration of opioids is intended.  Anesthetic plan and risks discussed with patient.  Use of blood products discussed with patient who consented to blood products.    Plan discussed with CRNA.

## 2024-04-15 NOTE — SIGNIFICANT EVENT
Patient laying back after zofran dose. Ride called and patient instructed will get dressed if nausea better and be ready to go home

## 2024-04-16 NOTE — ANESTHESIA POSTPROCEDURE EVALUATION
Patient: Fallon Williamson    Procedure Summary       Date: 04/15/24 Room / Location: [facility] OR [facility] / [facility]    Anesthesia Start: 0731 Anesthesia Stop: 1041    Procedures:       LAPAROSCOPIC SUPRACERVICAL HYSTERECTOMY      SACRALCOLPOPEXY      Cystoscopy Rigid      Salpingo Oophorectomy Laparoscopy (Bilateral) Diagnosis:       Vaginal prolapse      Stress incontinence      (Vaginal prolapse [N81.10])      (Stress incontinence [N39.3])    Surgeons: Jd Leon MD Responsible Provider: SHERRI Prakash    Anesthesia Type: general ASA Status: 2            Anesthesia Type: general    Vitals Value Taken Time   /50 04/15/24 1340   Temp 36.5 °C (97.7 °F) 04/15/24 1036   Pulse 63 04/15/24 1207   Resp 18 04/15/24 1036   SpO2 100 % 04/15/24 1342   Vitals shown include unfiled device data.    Anesthesia Post Evaluation    Patient location during evaluation: PACU  Patient participation: complete - patient participated  Level of consciousness: awake  Pain score: 2  Pain management: adequate  Multimodal analgesia pain management approach  Airway patency: patent  Two or more strategies used to mitigate risk of obstructive sleep apnea  Cardiovascular status: acceptable  Respiratory status: acceptable  Hydration status: acceptable  Postoperative Nausea and Vomiting: none    No notable events documented.

## 2024-04-18 LAB
LABORATORY COMMENT REPORT: NORMAL
PATH REPORT.FINAL DX SPEC: NORMAL
PATH REPORT.GROSS SPEC: NORMAL
PATH REPORT.TOTAL CANCER: NORMAL

## 2024-04-30 NOTE — PROGRESS NOTES
[facility]  Outpatient Clinic Note    Patient: Fallon Williamson  Age/Sex: 74 y.o., female  MRN: [id number]  Virtual Visit: An interactive audio and video telecommunication system which permits real time communications between the patient (at the originating site) and provider (at the distant site) was utilized to provide this telehealth service. Verbal consent was requested and obtained from Fallon Williamson on this date 5/1/2024 for a telehealth visit.     Chief Complaint:  2 week post op         History of Present Illness  This is a 74 y.o. female, who presents virtually to the clinic for her 2 week post op visit. She had a laparoscopic supracervical hysterectomy, sacralcolpopexy, and Salpingo Oophorectomy on 4/15/2024 with Dr. Leon. She reports she has been doing well since surgery. She is eating and drinking, as normal. Urine has remained clear, yellow. Bowels have returned to normal. No drainage from incision site. She is ambulating at baseline. No fevers or chills.      Past Medical & Surgical History  Past Medical History:   Diagnosis Date    Female bladder prolapse     Hypothyroidism     Kidney stones     Migraines     Presence of pessary      Past Surgical History:   Procedure Laterality Date    BREAST SURGERY Bilateral     reduction    CHOLECYSTECTOMY      CT ANGIO CORONARY ART WITH HEARTFLOW IF SCORE >30%  04/29/2016    CT HEART CORONARY ANGIOGRAM 4/29/2016 [facility]    EXTRACORPOREAL SHOCK WAVE LITHOTRIPSY      NERVE REPAIR      right forearm       Family History  No family history on file.    Social History  She reports that she has never smoked. She has never used smokeless tobacco. She reports that she does not currently use alcohol. She reports that she does not use drugs.    Allergies  Erythromycin and Adhesive tape-silicones    Medications:  Current Outpatient Medications on File Prior to Visit   Medication Sig Dispense Refill    aspirin 81 mg EC tablet Take 1 tablet (81 mg) by  mouth once daily.      estradiol (Estrace) 0.01 % (0.1 mg/gram) vaginal cream Insert pea size amount into vagina every night for 2 weeks, then 2x/week after (Patient not taking: Reported on 2024) 42.5 g 12    levothyroxine 37.5 mcg capsule Take by mouth early in the morning..      magnesium hydroxide (Milk of Magnesia) 400 mg/5 mL suspension Take 15 mL by mouth once daily as needed for constipation (if have not had a BM by postoperative day 3). 360 mL 0    nortriptyline (Pamelor) 75 mg capsule Take 1 capsule (75 mg) by mouth once daily at bedtime.      polyethylene glycol (Glycolax, Miralax) 17 gram/dose powder Take 17 g by mouth once daily. 238 g 0    tamsulosin (Flomax) 0.4 mg 24 hr capsule Take 3 days before surgery and 7 days after 10 capsule 0     No current facility-administered medications on file prior to visit.        Review of Systems   A comprehensive 10+ review of systems was negative except for: see hpi          Physical Exam                                                                                                                      General: Well developed, well nourished, alert and cooperative, appears in no acute distress  Eyes: no proptosis  Lungs: Breathing is easy, non-labored while speaking in clear and complete sentences.   Neuro: alert and oriented to person, place and time  Psych: Demonstrates good judgement and reason, without hallucinations, abnormal affect or abnormal behaviors.  Skin: no obvious lesions, no rashes      Labs  N/A    Imaging  N/A    IMPRESSION AND PLAN:  Fallon Williamson is a 74 y.o.   who presents for a bladder prolapse follow up visit     Stage 2 UVP   -Recommended physical floor therapy   -has #4 ring with support currently  -Discussed she most likely will need pelvic floor therapy after surgery given her discomfort with the pessary  -UDS did not demonstrate stress incontinence, she will not need a sling  -laparoscopic sacrocolpopexy with supracervical  hysterectomy on 4/15/24     GUSM:  Start estradiol     Follow up with Dr. Leon on 5/30/2024 for your 6 week post op visit    All questions and concerns were answered and addressed.  The patient expressed understanding and agrees with the plan.     Reviewed and approved by LEONARD LANDIN on 5/1/24 at 8:49 AM.

## 2024-05-01 ENCOUNTER — TELEMEDICINE (OUTPATIENT)
Dept: UROLOGY | Facility: CLINIC | Age: 74
End: 2024-05-01
Payer: MEDICARE

## 2024-05-01 DIAGNOSIS — Z09 POSTOP CHECK: Primary | ICD-10-CM

## 2024-05-01 DIAGNOSIS — N81.9 FEMALE GENITAL PROLAPSE, UNSPECIFIED TYPE: ICD-10-CM

## 2024-05-01 DIAGNOSIS — M62.89 PELVIC FLOOR DYSFUNCTION: ICD-10-CM

## 2024-05-01 PROCEDURE — 1036F TOBACCO NON-USER: CPT

## 2024-05-01 PROCEDURE — 99024 POSTOP FOLLOW-UP VISIT: CPT

## 2024-05-01 PROCEDURE — 1159F MED LIST DOCD IN RCRD: CPT

## 2024-05-30 ENCOUNTER — OFFICE VISIT (OUTPATIENT)
Dept: UROLOGY | Facility: CLINIC | Age: 74
End: 2024-05-30
Payer: MEDICARE

## 2024-05-30 DIAGNOSIS — Z09 POSTOP CHECK: Primary | ICD-10-CM

## 2024-05-30 PROCEDURE — 1159F MED LIST DOCD IN RCRD: CPT | Performed by: STUDENT IN AN ORGANIZED HEALTH CARE EDUCATION/TRAINING PROGRAM

## 2024-05-30 PROCEDURE — 99024 POSTOP FOLLOW-UP VISIT: CPT | Performed by: STUDENT IN AN ORGANIZED HEALTH CARE EDUCATION/TRAINING PROGRAM

## 2024-05-30 NOTE — PROGRESS NOTES
"HISTORY OF PRESENT ILLNESS:  Fallon Williamson is a 74 y.o. female who presents today 6 weeks status post laparoscopic supracervical hysterectomy, sacralcolpopexy, and Salpingo Oophorectomy on 4/15/2024. She reports she is doing well. She has no urgency or frequency.          Past Medical History  She has a past medical history of Female bladder prolapse, Hypothyroidism, Kidney stones, Migraines, and Presence of pessary.    Surgical History  She has a past surgical history that includes CT angio coronary art with heartflow if score >30% (04/29/2016); Extracorporeal shock wave lithotripsy; Nerve repair; Breast surgery (Bilateral); and Cholecystectomy.     Social History  She reports that she has never smoked. She has never used smokeless tobacco. She reports that she does not currently use alcohol. She reports that she does not use drugs.    Family History  No family history on file.     Allergies  Erythromycin and Adhesive tape-silicones      A comprehensive 10+ review of systems was negative except for: see hpi                          PHYSICAL EXAMINATION:  BP Readings from Last 3 Encounters:   04/15/24 143/50   04/11/24 128/69      Wt Readings from Last 3 Encounters:   04/15/24 71 kg (156 lb 8.4 oz)   04/11/24 71 kg (156 lb 8.4 oz)      BMI: Estimated body mass index is 27.73 kg/m² as calculated from the following:    Height as of 4/15/24: 1.6 m (5' 3\").    Weight as of 4/15/24: 71 kg (156 lb 8.4 oz).  BSA: Estimated body surface area is 1.78 meters squared as calculated from the following:    Height as of 4/15/24: 1.6 m (5' 3\").    Weight as of 4/15/24: 71 kg (156 lb 8.4 oz).  HEENT: Normocephalic, atraumatic, PER EOMI, nonicteric, trachea normal, thyroid normal, oropharynx normal.  CARDIAC: regular rate & rhythm, S1 & S2 normal.  No heaves, thrills, gallops or murmurs.  LUNGS: Clear to auscultation, no spinal or CV tenderness.  EXTREMITIES: No evidence of cyanosis, clubbing or edema.         Stage 0 " pop      Assessment:  Fallon Williamson is a 74 y.o.   who presents for a bladder prolapse     Stage 2 UVP     -UDS did not demonstrate stress incontinence, she will not need a sling  -S/P laparoscopic sacrocolpopexy with supracervical hysterectomy on 4/15/24, doing well      GUSM:  On estradiol       Follow up in 3 months       All questions and concerns were answered and addressed.  The patient expressed understanding and agrees with the plan.     Jd Leon MD    Scribe Attestation  By signing my name below, I, Qasim Landeros   attest that this documentation has been prepared under the direction and in the presence of Jd Leon MD.

## 2024-08-22 ENCOUNTER — APPOINTMENT (OUTPATIENT)
Dept: UROLOGY | Facility: CLINIC | Age: 74
End: 2024-08-22
Payer: MEDICARE

## (undated) DEVICE — TISSUE ADHESIVE, PREMIERPRO EXOFIN, PRECISION PEN HV, 1.0ML

## (undated) DEVICE — NEEDLE, SAFETY, 22 G X 1.5 IN

## (undated) DEVICE — PREP TRAY, SKIN, DRY, W/GLOVES

## (undated) DEVICE — KIT, MINOR, DOUBLE BASIN

## (undated) DEVICE — CARE KIT, LAPAROSCOPIC, ADVANCED

## (undated) DEVICE — DISSECTOR, KITTNER, PEANUT, 5MM

## (undated) DEVICE — DRESSING, NON-ADHERENT, TELFA, OUCHLESS, 3 X 8 IN, STERILE

## (undated) DEVICE — SYRINGE, 60 CC, LUER LOCK, MONOJECT, W/O CAP, LF

## (undated) DEVICE — DEVICE, VOYANT, MARYLAND, 5MM X 37CM

## (undated) DEVICE — RETRACTOR, CERVICAL CUP, VCARE, STANDARD

## (undated) DEVICE — IRRIGATION SET, CYSTOSCOPY, TURP, Y, CONTINUOUS, 81 IN

## (undated) DEVICE — ACCESS SYS, KII SHIELDED BLADED, Z-THREAD, 12X100CM

## (undated) DEVICE — SUTURE, MONOCRYL, 3-0, 27 IN, SH, UNDYED

## (undated) DEVICE — TUBE SET, PNEUMOLAR HEATED, SMOKE EVACU, HIGH-FLOW

## (undated) DEVICE — SCISSOR, MINI ENDO CUT, TIPS, DISP

## (undated) DEVICE — COVER HANDLE LIGHT, STERIS, BLUE, STERILE

## (undated) DEVICE — TROCAR, OPTICAL BLADELESS 5MM X 100 W/ADVANCED FIXATION

## (undated) DEVICE — POSITIONING KIT, PINK PAD XL, ADVANCED TRENDELENBURG

## (undated) DEVICE — TRAY, FOLEY, URINE METER, SURESTEP, 16FR, W/STATLOCK

## (undated) DEVICE — ELECTRODE, OPTI2 LAPAROSCOPIC SPATULA, CURVED

## (undated) DEVICE — SOLUTION, INJECTION, USP, SODIUM CHLORIDE 0.9%, .9, NACL, 1000 ML, BAG

## (undated) DEVICE — Device

## (undated) DEVICE — GOWN, LARGE, IMPERVIOUS SLEEVES

## (undated) DEVICE — CAUTERY, PENCIL, PUSH BUTTON, SMOKE EVAC, 70MM

## (undated) DEVICE — SUTURE, STRATAFIX, SPIRAL, 2-0 SH, PDS PLUS VIOLET

## (undated) DEVICE — COVER, TABLE, 44X90

## (undated) DEVICE — NEEDLE, SPINAL, 20 G X 3.5 IN, YELLOW HUB

## (undated) DEVICE — COUNTER, NEEDLE, FOAM STRIP, DOUBLE, W/BLADEGUARD, 30 COUNT

## (undated) DEVICE — SPONGE, LAP, XRAY DECT, 4IN X 18IN, W/MASTER DMT, STERILE

## (undated) DEVICE — SUTURE, PDS, 0, 18 IN, LIGATING LOOP, VIOLET

## (undated) DEVICE — PAD, SANITARY, OBSTETRICAL, W/ADHESIVE STRIP, WING, 11 IN, NS

## (undated) DEVICE — TROCAR SYSTEM, BALLOON, KII GELPORT, 12 X 100MM

## (undated) DEVICE — RETRACTOR, SURGICAL, RING, PLASTIC, DISPOSABLE

## (undated) DEVICE — DRAPE, INSTRUMENT, W/POUCH, STERI DRAPE, 9 5/8 X 18 LONG

## (undated) DEVICE — DRAPE PACK, LAVH, W/ATTACHED LEGGINGS, W/POUCH, 100 X 114 IN, LF, STERILE

## (undated) DEVICE — SUTURE, VICRYL, 4-0, 18 IN, PS2, UNDYED

## (undated) DEVICE — TOWEL PACK, STERILE, 4/PACK, BLUE

## (undated) DEVICE — DRAPE PACK, MINOR, CUSTOM, GEAUGA

## (undated) DEVICE — ASSEMBLY, STRYKER FLOW 2, SUCTION IRRIGATOR, WITH TIP

## (undated) DEVICE — SUTURE, VICRYL, 0, 27 IN, CT-2, UNDYED